# Patient Record
Sex: FEMALE | Race: BLACK OR AFRICAN AMERICAN | NOT HISPANIC OR LATINO | Employment: FULL TIME | URBAN - METROPOLITAN AREA
[De-identification: names, ages, dates, MRNs, and addresses within clinical notes are randomized per-mention and may not be internally consistent; named-entity substitution may affect disease eponyms.]

---

## 2024-03-08 ENCOUNTER — HOSPITAL ENCOUNTER (OUTPATIENT)
Dept: VASCULAR MEDICINE | Facility: CLINIC | Age: 54
Discharge: HOME | End: 2024-03-08
Payer: COMMERCIAL

## 2024-03-08 DIAGNOSIS — R09.89 OTHER SPECIFIED SYMPTOMS AND SIGNS INVOLVING THE CIRCULATORY AND RESPIRATORY SYSTEMS: ICD-10-CM

## 2024-03-08 DIAGNOSIS — I65.23 OCCLUSION AND STENOSIS OF BILATERAL CAROTID ARTERIES: ICD-10-CM

## 2024-03-08 DIAGNOSIS — I77.3 ARTERIAL FIBROMUSCULAR DYSPLASIA (CMS-HCC): ICD-10-CM

## 2024-03-08 DIAGNOSIS — I77.1 STRICTURE OF ARTERY (CMS-HCC): ICD-10-CM

## 2024-03-08 PROCEDURE — 93880 EXTRACRANIAL BILAT STUDY: CPT | Performed by: SURGERY

## 2024-03-08 PROCEDURE — 93880 EXTRACRANIAL BILAT STUDY: CPT

## 2024-04-15 ENCOUNTER — OFFICE VISIT (OUTPATIENT)
Dept: CARDIOLOGY | Facility: HOSPITAL | Age: 54
End: 2024-04-15
Payer: COMMERCIAL

## 2024-04-15 VITALS
SYSTOLIC BLOOD PRESSURE: 132 MMHG | HEIGHT: 65 IN | DIASTOLIC BLOOD PRESSURE: 82 MMHG | HEART RATE: 94 BPM | OXYGEN SATURATION: 97 % | WEIGHT: 279 LBS | BODY MASS INDEX: 46.48 KG/M2

## 2024-04-15 DIAGNOSIS — E87.6 HYPOKALEMIA: ICD-10-CM

## 2024-04-15 DIAGNOSIS — I10 ESSENTIAL HYPERTENSION, BENIGN: ICD-10-CM

## 2024-04-15 PROBLEM — I77.3 FIBROMUSCULAR DYSPLASIA (CMS-HCC): Status: ACTIVE | Noted: 2024-04-15

## 2024-04-15 PROCEDURE — 3079F DIAST BP 80-89 MM HG: CPT | Performed by: INTERNAL MEDICINE

## 2024-04-15 PROCEDURE — 3075F SYST BP GE 130 - 139MM HG: CPT | Performed by: INTERNAL MEDICINE

## 2024-04-15 PROCEDURE — 99214 OFFICE O/P EST MOD 30 MIN: CPT | Performed by: INTERNAL MEDICINE

## 2024-04-15 PROCEDURE — 1036F TOBACCO NON-USER: CPT | Performed by: INTERNAL MEDICINE

## 2024-04-15 RX ORDER — POTASSIUM CHLORIDE 750 MG/1
20 TABLET, FILM COATED, EXTENDED RELEASE ORAL DAILY
COMMUNITY

## 2024-04-15 RX ORDER — LOSARTAN POTASSIUM 50 MG/1
50 TABLET ORAL DAILY
Qty: 90 TABLET | Refills: 3 | Status: SHIPPED | OUTPATIENT
Start: 2024-04-15 | End: 2025-04-15

## 2024-04-15 RX ORDER — AMLODIPINE BESYLATE 5 MG/1
5 TABLET ORAL DAILY
COMMUNITY
Start: 2024-02-28

## 2024-04-15 RX ORDER — SEMAGLUTIDE 1.34 MG/ML
1 INJECTION, SOLUTION SUBCUTANEOUS
COMMUNITY
Start: 2024-01-24

## 2024-04-15 RX ORDER — LISINOPRIL 20 MG/1
20 TABLET ORAL
COMMUNITY
Start: 2024-03-27 | End: 2024-04-15 | Stop reason: SINTOL

## 2024-04-15 NOTE — PATIENT INSTRUCTIONS
Stop Lisinopril and start Losartan 50mg once daily.  Ordered blood work - we will reach out with results.     See you back in 6 months for clinical check in.    Luna Pagan MD  Co-Director, Vascular Center  [facility] [place], [facility]   [facility] Master Clinician in Fibromuscular Dysplasia and Vascular Care  Professor of Medicine  [place]

## 2024-04-15 NOTE — LETTER
April 15, 2024     Maegan Licona MD  [id number]  [facility] [address] [address]    Patient: Maxine Saavedra   YOB: 1970   Date of Visit: 4/15/2024       Dear Colleagues,    Ms. Saavedra came to see me for a follow-up on her carotid tortuosity, mild possible FMD. I think all is stable and honestly, she doesn't need any imaging for 1-2 years.    I did take the liberty of switching lisinopril 20 mg to losartan 50 mg; she is having throat itch worse with escalating the lisinopril dose -- does not seem like angioedema.  Her hypokalemia is strange ? Check mello/renin. She is going to follow-up with all of you on her Ozempic -- having a lot of issues with belching and diarrhea. All the best.    Sincerely,     Luna Pagan MD      CC: Zulay Del Cid, APRN-CNP  ELOISE Bond-CHARLI  ______________________________________________________________________________________    04/15/24  5:00 PM    Vascular Medicine    History of Present Illness  This terrific 53 year-old woman, a  and mom to a 26 year-old son, is seen in follow-up of possible mild bilateral ICA FMD.    She has a longstanding hx of intermittent chest pain and palpitations. She had a cardiac work-up with Dr. Fernandes in 2021.  In November, 2022 she had an episode of left arm heaviness/numbness at work that lasted seconds. A few weeks later, she had an episode of dizziness and chest discomfort along with palpitations (11.28.202). She was seen in the [place] ER. She had low serum potassium. She underwent CTA head/neck which reported ICA FMD. She actually had another ER visit in December which leg pain and numbness -- CTA head no abnormality.  She has no FMD below the neck.    Since I saw her last April, she had an ER visit in 10.2023 with intense headache -- this led to another CTA head/neck with the same finding of possible ICA FMD.  "She then had an evaluation at the [facility] FMD program with Dr. Mills who also thought her ICA findings were not classical for FMD. Below neck imaging done CTA c/a/p with right subclavian ectasia, no FMD.    She has been doing OK overall since October.  No recurrent headaches.  No major chest pain. She had lost some weight on Ozempic but recently developed episodes of severe \"sulphur burps\" and diarrhea and will likely be stopping this.    She has had higher lisinopril requirements for HTN 5---20---40 mg now back down to 20 mg with addition of amlodipine. She is worried lisinopril gives her an itchy throat that has worsened at higher doses.  She has recently had hypokalemia, now on KCL 20 meq QD.    Patient Active Problem List   Diagnosis    Fibromuscular dysplasia (CMS-HCC)     Past Medical History:   Diagnosis Date    Hypertension     Obesity     Other specified noninflammatory disorders of vagina 02/09/2015    Vaginal odor     Past Surgical History:   Procedure Laterality Date    GALLBLADDER SURGERY  12/15/2016    Gallbladder Surgery     ON examination:  .Patient Vitals for the past 24 hrs:   BP Pulse SpO2 Height Weight   04/15/24 1603 132/82 -- -- -- --   04/15/24 1602 138/84 94 97 % 1.651 m (5' 5\") 127 kg (279 lb)   HEENT benign, no Aj's  No cervical bruits  +S1, S2, RRR; no m/r/g  No abdominal bruits  Palpable pulses     Results/Data  Carotid duplex  3.2024  Tortuosity both ICAs with associated slight velocity shift, not as high as last year  Right ICA  cm/sec  Left ICA  cm/sed    10.2023 CTA head/neck  Minor bilateral ICA luminal irregularities reported  And ? Mild MCA stenoses bilaterally    10.2023 CTA c/a/p  Reported no FMD, aneurysms, dissections below neck    Prior imaging  CTA head/neck from 11.2022  She has Bilateral ICA tortuosity, redundancy  Left ICA has perhaps 2 \"beads' in an area of looping  No IC aneurysms  Vertebrals normal  Aortic arch OK     CTA abd/pelvis 12.2022  Abdominal " "aorta/iliacs normal  I don't see renal, mesenteric, or iliac FMD  Ascending and descending thoracic aorta normal     Renal duplex 12.2022 normal velocities, normal flow into the kidneys        Assessment/Plan:      53 year-old woman with migraine headaches, episodes of dizziness, neck pain, pulsatile tinnitus. She has been found to have ICA abnormalities  bilaterally, mainly ICA tortuosity, redundancy, S-curves. In the left ICA loop, she may have a few beads. Her imaging is not classical for FMD. It is likely some very mild form of FMD (and tortuosity is associated with this), but its extent would not qualify as a patient with multifocal FMD for the [place] Registry. She has no FMD below the neck, now seen again on 10.2023 CTA at [facility].    At this time, would obtain carotid duplex 1-2 years.    She has HTN now on lisinopril + amlodipine. She has \"scratchy throat\" on lisinopril, I think could see if sx better on losartan 50 mg QD; continue amlodipine and discontinue lisinopril.  Check repeat BMP, aldosterone/renin given hypokalemia on lisinopril.    Reasonable to continue aspirin.    She is working on weight loss but may need to go off Ozempic. She will follow-up with her other providers.    WE discussed how she has honestly been somewhat over imaged over the past 1 1/2 years for minor ICA abnormalities.    RTC 6 months for clinical check in on HTN. Carotid duplex could honestly wait 2 years.    Luna Pagan MD    "

## 2024-04-15 NOTE — PROGRESS NOTES
"04/15/24  5:00 PM    Vascular Medicine    History of Present Illness  This terrific 53 year-old woman, a  and mom to a 26 year-old son, is seen in follow-up of possible mild bilateral ICA FMD.    She has a longstanding hx of intermittent chest pain and palpitations. She had a cardiac work-up with Dr. Fernandes in 2021.  In November, 2022 she had an episode of left arm heaviness/numbness at work that lasted seconds. A few weeks later, she had an episode of dizziness and chest discomfort along with palpitations (11.28.202). She was seen in the [place] ER. She had low serum potassium. She underwent CTA head/neck which reported ICA FMD. She actually had another ER visit in December which leg pain and numbness -- CTA head no abnormality.  She has no FMD below the neck.    Since I saw her last April, she had an ER visit in 10.2023 with intense headache -- this led to another CTA head/neck with the same finding of possible ICA FMD. She then had an evaluation at the [facility] FMD program with Dr. Mills who also thought her ICA findings were not classical for FMD. Below neck imaging done CTA c/a/p with right subclavian ectasia, no FMD.    She has been doing OK overall since October.  No recurrent headaches.  No major chest pain. She had lost some weight on Ozempic but recently developed episodes of severe \"sulphur burps\" and diarrhea and will likely be stopping this.    She has had higher lisinopril requirements for HTN 5---20---40 mg now back down to 20 mg with addition of amlodipine. She is worried lisinopril gives her an itchy throat that has worsened at higher doses.  She has recently had hypokalemia, now on KCL 20 meq QD.    Patient Active Problem List   Diagnosis    Fibromuscular dysplasia (CMS-HCC)     Past Medical History:   Diagnosis Date    Hypertension     Obesity     Other specified noninflammatory disorders of vagina 02/09/2015    Vaginal odor     Past Surgical History:   Procedure " "Laterality Date    GALLBLADDER SURGERY  12/15/2016    Gallbladder Surgery     ON examination:  .Patient Vitals for the past 24 hrs:   BP Pulse SpO2 Height Weight   04/15/24 1603 132/82 -- -- -- --   04/15/24 1602 138/84 94 97 % 1.651 m (5' 5\") 127 kg (279 lb)   HEENT benign, no Aj's  No cervical bruits  +S1, S2, RRR; no m/r/g  No abdominal bruits  Palpable pulses     Results/Data  Carotid duplex  3.2024  Tortuosity both ICAs with associated slight velocity shift, not as high as last year  Right ICA  cm/sec  Left ICA  cm/sed    10.2023 CTA head/neck  Minor bilateral ICA luminal irregularities reported  And ? Mild MCA stenoses bilaterally    10.2023 CTA c/a/p  Reported no FMD, aneurysms, dissections below neck    Prior imaging  CTA head/neck from 11.2022  She has Bilateral ICA tortuosity, redundancy  Left ICA has perhaps 2 \"beads' in an area of looping  No IC aneurysms  Vertebrals normal  Aortic arch OK     CTA abd/pelvis 12.2022  Abdominal aorta/iliacs normal  I don't see renal, mesenteric, or iliac FMD  Ascending and descending thoracic aorta normal     Renal duplex 12.2022 normal velocities, normal flow into the kidneys        Assessment/Plan:      53 year-old woman with migraine headaches, episodes of dizziness, neck pain, pulsatile tinnitus. She has been found to have ICA abnormalities  bilaterally, mainly ICA tortuosity, redundancy, S-curves. In the left ICA loop, she may have a few beads. Her imaging is not classical for FMD. It is likely some very mild form of FMD (and tortuosity is associated with this), but its extent would not qualify as a patient with multifocal FMD for the [place] Registry. She has no FMD below the neck, now seen again on 10.2023 CTA at [facility].    At this time, would obtain carotid duplex 1-2 years.    She has HTN now on lisinopril + amlodipine. She has \"scratchy throat\" on lisinopril, I think could see if sx better on losartan 50 mg QD; continue amlodipine and discontinue " lisinopril.  Check repeat BMP, aldosterone/renin given hypokalemia on lisinopril.    Reasonable to continue aspirin.    She is working on weight loss but may need to go off Ozempic. She will follow-up with her other providers.    WE discussed how she has honestly been somewhat over imaged over the past 1 1/2 years for minor ICA abnormalities.    RTC 6 months for clinical check in on HTN. Carotid duplex could honestly wait 2 years.    Luna Pagan MD

## 2024-05-15 ENCOUNTER — LAB (OUTPATIENT)
Dept: LAB | Facility: LAB | Age: 54
End: 2024-05-15
Payer: COMMERCIAL

## 2024-05-15 ENCOUNTER — TELEPHONE (OUTPATIENT)
Dept: CARDIOLOGY | Facility: HOSPITAL | Age: 54
End: 2024-05-15
Payer: COMMERCIAL

## 2024-05-15 DIAGNOSIS — R53.83 OTHER FATIGUE: ICD-10-CM

## 2024-05-15 DIAGNOSIS — E87.6 HYPOKALEMIA: ICD-10-CM

## 2024-05-15 LAB
ANION GAP SERPL CALC-SCNC: 14 MMOL/L (ref 10–20)
BUN SERPL-MCNC: 9 MG/DL (ref 6–23)
CALCIUM SERPL-MCNC: 9.1 MG/DL (ref 8.6–10.6)
CHLORIDE SERPL-SCNC: 106 MMOL/L (ref 98–107)
CO2 SERPL-SCNC: 24 MMOL/L (ref 21–32)
CREAT SERPL-MCNC: 0.77 MG/DL (ref 0.5–1.05)
EGFRCR SERPLBLD CKD-EPI 2021: >90 ML/MIN/1.73M*2
ERYTHROCYTE [DISTWIDTH] IN BLOOD BY AUTOMATED COUNT: 13.2 % (ref 11.5–14.5)
GLUCOSE SERPL-MCNC: 123 MG/DL (ref 74–99)
HCT VFR BLD AUTO: 38.3 % (ref 36–46)
HGB BLD-MCNC: 11.6 G/DL (ref 12–16)
MCH RBC QN AUTO: 29 PG (ref 26–34)
MCHC RBC AUTO-ENTMCNC: 30.3 G/DL (ref 32–36)
MCV RBC AUTO: 96 FL (ref 80–100)
NRBC BLD-RTO: 0 /100 WBCS (ref 0–0)
PLATELET # BLD AUTO: 381 X10*3/UL (ref 150–450)
POTASSIUM SERPL-SCNC: 3.7 MMOL/L (ref 3.5–5.3)
RBC # BLD AUTO: 4 X10*6/UL (ref 4–5.2)
SODIUM SERPL-SCNC: 140 MMOL/L (ref 136–145)
TSH SERPL-ACNC: 1.25 MIU/L (ref 0.44–3.98)
WBC # BLD AUTO: 6.2 X10*3/UL (ref 4.4–11.3)

## 2024-05-15 PROCEDURE — 82088 ASSAY OF ALDOSTERONE: CPT

## 2024-05-15 PROCEDURE — 84443 ASSAY THYROID STIM HORMONE: CPT

## 2024-05-15 PROCEDURE — 85027 COMPLETE CBC AUTOMATED: CPT

## 2024-05-15 PROCEDURE — 36415 COLL VENOUS BLD VENIPUNCTURE: CPT

## 2024-05-15 PROCEDURE — 80048 BASIC METABOLIC PNL TOTAL CA: CPT

## 2024-05-16 ENCOUNTER — PATIENT MESSAGE (OUTPATIENT)
Dept: CARDIOLOGY | Facility: HOSPITAL | Age: 54
End: 2024-05-16

## 2024-05-18 LAB
ALDOST SERPL-MCNC: 6.8 NG/DL
ALDOST/RENIN PLAS-RTO: 0.6 RATIO
RENIN PLAS-CCNC: 12 NG/ML/HR

## 2024-07-26 ENCOUNTER — PATIENT MESSAGE (OUTPATIENT)
Dept: CARDIOLOGY | Facility: HOSPITAL | Age: 54
End: 2024-07-26

## 2024-07-26 DIAGNOSIS — I10 ESSENTIAL HYPERTENSION, BENIGN: ICD-10-CM

## 2024-07-29 RX ORDER — HYDROCHLOROTHIAZIDE 12.5 MG/1
12.5 TABLET ORAL DAILY
Qty: 90 TABLET | Refills: 3 | Status: SHIPPED | OUTPATIENT
Start: 2024-07-29 | End: 2025-07-29

## 2024-10-14 ENCOUNTER — TELEMEDICINE (OUTPATIENT)
Dept: CARDIOLOGY | Facility: HOSPITAL | Age: 54
End: 2024-10-14
Payer: COMMERCIAL

## 2024-10-14 DIAGNOSIS — I77.3 FIBROMUSCULAR DYSPLASIA (CMS-HCC): Primary | ICD-10-CM

## 2024-10-14 DIAGNOSIS — I10 ESSENTIAL HYPERTENSION, BENIGN: ICD-10-CM

## 2024-10-14 PROCEDURE — 1036F TOBACCO NON-USER: CPT | Performed by: INTERNAL MEDICINE

## 2024-10-14 PROCEDURE — 99214 OFFICE O/P EST MOD 30 MIN: CPT | Performed by: INTERNAL MEDICINE

## 2024-10-14 RX ORDER — LOSARTAN POTASSIUM 100 MG/1
100 TABLET ORAL DAILY
Qty: 90 TABLET | Refills: 3 | Status: SHIPPED | OUTPATIENT
Start: 2024-10-14 | End: 2025-10-14

## 2024-10-14 NOTE — PATIENT INSTRUCTIONS
Increase Losartan to 100mg once daily.  Continue other medications as prescribed.    See you back in 4 months for check in.    Luna Pagan MD  Co-Director, Vascular Center  [facility] [place], [facility]   [facility] Naz[facility] Family Master Clinician in Fibromuscular Dysplasia and Vascular Care  Professor of Medicine  [facility]

## 2024-10-14 NOTE — LETTER
October 14, 2024     Maegan Licona MD  [address]  The [address] [address]    Patient: Maxine Saavedra   YOB: 1970   Date of Visit: 10/14/2024       Dear Dr. Maegan Licona MD:    Thank you for referring Maxine Saavedra to me for evaluation. Below are my notes for this consultation.  If you have questions, please do not hesitate to call me. I look forward to following your patient along with you.       Sincerely,     Luna Pagan MD      CC: No Recipients  ______________________________________________________________________________________    10/14/24  10:05 AM    Vascular Medicine    Virtual or Telephone Consent    An interactive audio and video telecommunication system which permits real time communications between the patient (at the originating site) and provider (at the distant site) was utilized to provide this telehealth service.   Verbal consent was requested and obtained from Maxine Saavedra on this date, 10/14/24 for a telehealth visit.       History of Present Illness  This terrific 53 year-old woman, a  and mom to an adult son is seen in follow-up of possible mild bilateral ICA FMD, HTN.    She has a longstanding hx of intermittent chest pain and palpitations. She had a cardiac work-up with Dr. Fernandes in 2021.  In November, 2022 she had an episode of left arm heaviness/numbness at work that lasted seconds. A few weeks later, she had an episode of dizziness and chest discomfort along with palpitations (11.28.202). She was seen in the [place] ER. She had low serum potassium. She underwent CTA head/neck which reported ICA FMD. She actually had another ER visit in December which leg pain and numbness -- CTA head no abnormality.  She has no FMD below the neck  She has had repeat CTA head/neck with similar findings.    Since I saw her last April, she tried Ozempic  "but had terrible GI side effects. She lost ~15# but unfortunately regained this. She is doing fine on losartan 50 mg at bedtime and amlodipine and low dose hydrochlorothiazide. Bps are generally better, but still 120s-mid to high 140s/82.    She is working with endocrinology on other options for weight loss --- tried Wellbutrin with mental health effects.     Patient Active Problem List   Diagnosis   • Fibromuscular dysplasia (CMS-HCC)   • Essential hypertension, benign   • Hypokalemia     Past Medical History:   Diagnosis Date   • Hypertension    • Obesity    • Other specified noninflammatory disorders of vagina 02/09/2015    Vaginal odor     Past Surgical History:   Procedure Laterality Date   • GALLBLADDER SURGERY  12/15/2016    Gallbladder Surgery     ON examination:  HEENT benign, no Aj's  Breathing non labored  Fluid speech     Results/Data  8.2204 Labs (CC) Reviewed  Cr 0.79  K 3.4  Na 141  HgB A1c 4.9%    Tchol 130/HDL 54/LDL 60/Trig 79  TSH 1.25    HgB 11.6, PLT 381K    Carotid duplex  3.2024  Tortuosity both ICAs with associated slight velocity shift, not as high as last year  Right ICA  cm/sec  Left ICA  cm/sed    10.2023 CTA head/neck  Minor bilateral ICA luminal irregularities reported  And ? Mild MCA stenoses bilaterally    10.2023 CTA c/a/p  Reported no FMD, aneurysms, dissections below neck    Prior imaging  CTA head/neck from 11.2022  She has Bilateral ICA tortuosity, redundancy  Left ICA has perhaps 2 \"beads' in an area of looping  No IC aneurysms  Vertebrals normal  Aortic arch OK     CTA abd/pelvis 12.2022  Abdominal aorta/iliacs normal  I didn't see renal, mesenteric, or iliac FMD  Ascending and descending thoracic aorta normal     Renal duplex 12.2022 normal velocities, normal flow into the kidneys        Assessment/Plan:      53 year-old woman with migraine headaches, episodes of dizziness, neck pain, pulsatile tinnitus. She has been found to have ICA abnormalities  " bilaterally, mainly ICA tortuosity, redundancy, S-curves. In the left ICA loop, she may have a few beads. Her imaging is not classical for FMD. It is likely some very mild form of FMD (and tortuosity is associated with this), but its extent would not qualify as a patient with multifocal FMD for the [place] Registry. She has no FMD below the neck, now seen again on 10.2023 CTA at [facility].  Reasonable to continue aspirin for possible mild FMD.    In terms of FMD, would obtain carotid duplex at 2 year marked.  Repeat head to pelvis imaging perhaps 2028. She has had more than adequate vascular imaging at this time, and I think we need to space things out.    BP continues to be a challenge.  BP better on losartan + amlodipine + hydrochlorothiazide but not normalized. We will try to escalate losartan to 100 mg and see how she does with this. Her last potassium was on low side, so I'm reluctant to escalate hydrochlorothiazide.    She is working on weight loss; did not tolerate GLP1.  She is followed by endocrinology at [facility] and we discussed some life stype modifications beyond pharmacotherapy.    RTC for BP check 4 months and repeat labs.  Don't need to repeat carotid duplex until 2026.    Luna Pagan MD

## 2024-10-14 NOTE — PROGRESS NOTES
10/14/24  10:05 AM    Vascular Medicine    Virtual or Telephone Consent    An interactive audio and video telecommunication system which permits real time communications between the patient (at the originating site) and provider (at the distant site) was utilized to provide this telehealth service.   Verbal consent was requested and obtained from Maxine Astorgakumar on this date, 10/14/24 for a telehealth visit.       History of Present Illness  This terrific 53 year-old woman, a  and mom to an adult son is seen in follow-up of possible mild bilateral ICA FMD, HTN.    She has a longstanding hx of intermittent chest pain and palpitations. She had a cardiac work-up with Dr. Fernandes in 2021.  In November, 2022 she had an episode of left arm heaviness/numbness at work that lasted seconds. A few weeks later, she had an episode of dizziness and chest discomfort along with palpitations (11.28.202). She was seen in the [place] ER. She had low serum potassium. She underwent CTA head/neck which reported ICA FMD. She actually had another ER visit in December which leg pain and numbness -- CTA head no abnormality.  She has no FMD below the neck  She has had repeat CTA head/neck with similar findings.    Since I saw her last April, she tried Ozempic but had terrible GI side effects. She lost ~15# but unfortunately regained this. She is doing fine on losartan 50 mg at bedtime and amlodipine and low dose hydrochlorothiazide. Bps are generally better, but still 120s-mid to high 140s/82.    She is working with endocrinology on other options for weight loss --- tried Wellbutrin with mental health effects.     Patient Active Problem List   Diagnosis    Fibromuscular dysplasia (CMS-HCC)    Essential hypertension, benign    Hypokalemia     Past Medical History:   Diagnosis Date    Hypertension     Obesity     Other specified noninflammatory disorders of vagina 02/09/2015    Vaginal odor     Past Surgical  "History:   Procedure Laterality Date    GALLBLADDER SURGERY  12/15/2016    Gallbladder Surgery     ON examination:  HEENT benign, no Aj's  Breathing non labored  Fluid speech     Results/Data  8.2204 Labs ([facility]) Reviewed  Cr 0.79  K 3.4  Na 141  HgB A1c 4.9%    Tchol 130/HDL 54/LDL 60/Trig 79  TSH 1.25    HgB 11.6, PLT 381K    Carotid duplex  3.2024  Tortuosity both ICAs with associated slight velocity shift, not as high as last year  Right ICA  cm/sec  Left ICA  cm/sed    10.2023 CTA head/neck  Minor bilateral ICA luminal irregularities reported  And ? Mild MCA stenoses bilaterally    10.2023 CTA c/a/p  Reported no FMD, aneurysms, dissections below neck    Prior imaging  CTA head/neck from 11.2022  She has Bilateral ICA tortuosity, redundancy  Left ICA has perhaps 2 \"beads' in an area of looping  No IC aneurysms  Vertebrals normal  Aortic arch OK     CTA abd/pelvis 12.2022  Abdominal aorta/iliacs normal  I didn't see renal, mesenteric, or iliac FMD  Ascending and descending thoracic aorta normal     Renal duplex 12.2022 normal velocities, normal flow into the kidneys        Assessment/Plan:      53 year-old woman with migraine headaches, episodes of dizziness, neck pain, pulsatile tinnitus. She has been found to have ICA abnormalities  bilaterally, mainly ICA tortuosity, redundancy, S-curves. In the left ICA loop, she may have a few beads. Her imaging is not classical for FMD. It is likely some very mild form of FMD (and tortuosity is associated with this), but its extent would not qualify as a patient with multifocal FMD for the [place] Registry. She has no FMD below the neck, now seen again on 10.2023 CTA at [facility].  Reasonable to continue aspirin for possible mild FMD.    In terms of FMD, would obtain carotid duplex at 2 year marked.  Repeat head to pelvis imaging perhaps 2028. She has had more than adequate vascular imaging at this time, and I think we need to space things out.    BP continues to be " a challenge.  BP better on losartan + amlodipine + hydrochlorothiazide but not normalized. We will try to escalate losartan to 100 mg and see how she does with this. Her last potassium was on low side, so I'm reluctant to escalate hydrochlorothiazide.    She is working on weight loss; did not tolerate GLP1.  She is followed by endocrinology at [facility] and we discussed some life stype modifications beyond pharmacotherapy.    RTC for BP check 4 months and repeat labs.  Don't need to repeat carotid duplex until 2026.    Luna Pagan MD

## 2025-03-03 ENCOUNTER — OFFICE VISIT (OUTPATIENT)
Dept: CARDIOLOGY | Facility: HOSPITAL | Age: 55
End: 2025-03-03
Payer: COMMERCIAL

## 2025-03-03 VITALS
DIASTOLIC BLOOD PRESSURE: 79 MMHG | SYSTOLIC BLOOD PRESSURE: 120 MMHG | HEIGHT: 65 IN | OXYGEN SATURATION: 98 % | BODY MASS INDEX: 48.82 KG/M2 | HEART RATE: 88 BPM | WEIGHT: 293 LBS

## 2025-03-03 DIAGNOSIS — M79.89 LEFT LEG SWELLING: ICD-10-CM

## 2025-03-03 DIAGNOSIS — E87.6 HYPOKALEMIA: ICD-10-CM

## 2025-03-03 DIAGNOSIS — I10 ESSENTIAL HYPERTENSION, BENIGN: ICD-10-CM

## 2025-03-03 DIAGNOSIS — I77.3 FIBROMUSCULAR DYSPLASIA (CMS-HCC): Primary | ICD-10-CM

## 2025-03-03 PROCEDURE — 99214 OFFICE O/P EST MOD 30 MIN: CPT | Performed by: INTERNAL MEDICINE

## 2025-03-03 PROCEDURE — 3008F BODY MASS INDEX DOCD: CPT | Performed by: INTERNAL MEDICINE

## 2025-03-03 PROCEDURE — 3074F SYST BP LT 130 MM HG: CPT | Performed by: INTERNAL MEDICINE

## 2025-03-03 PROCEDURE — 3078F DIAST BP <80 MM HG: CPT | Performed by: INTERNAL MEDICINE

## 2025-03-03 PROCEDURE — 1036F TOBACCO NON-USER: CPT | Performed by: INTERNAL MEDICINE

## 2025-03-03 RX ORDER — SPIRONOLACTONE 25 MG/1
25 TABLET ORAL DAILY
Qty: 30 TABLET | Refills: 11 | Status: SHIPPED | OUTPATIENT
Start: 2025-03-03 | End: 2026-03-03

## 2025-03-03 NOTE — LETTER
March 3, 2025     Maegan Licona MD  [address]  [facility] [address] [address]    Patient: Maxine Saavedra   YOB: 1970   Date of Visit: 3/3/2025       Dear Dr. Maegan Licona MD:    Thank you for referring Maxine Saavedra to me for evaluation. Below are my notes for this consultation.  If you have questions, please do not hesitate to call me. I look forward to following your patient along with you.       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    She has a longstanding hx of intermittent chest pain and palpitations. She had a cardiac work-up with Dr. Fernandes in 2021.  In November, 2022 she had an episode of left arm heaviness/numbness at work that lasted seconds. A few weeks later, she had an episode of dizziness and chest discomfort along with palpitations (11.28.202). She was seen in the [place] ER. She had low serum potassium. She underwent CTA head/neck which reported ICA FMD. She actually had another ER visit in December which leg pain and numbness -- CTA head no abnormality.  She has no FMD below the neck  She has had repeat CTA head/neck with similar findings.    We have been managing her HTN and escalating meds. At our October visit, we escalated losartan to 100 mg/day top amlodpine and hydrochlorothiazide.    Since I saw her last, Bps generally now well controlled 110s/70s, but potassium still low and she is having left moreso right leg swelling. She is due to undergo hysteroscopy.    She may try a different GLP1 agonist.      Patient Active Problem List  "  Diagnosis   • Fibromuscular dysplasia (CMS-HCC)   • Essential hypertension, benign   • Hypokalemia     Past Medical History:   Diagnosis Date   • Hypertension    • Obesity    • Other specified noninflammatory disorders of vagina 02/09/2015    Vaginal odor     Past Surgical History:   Procedure Laterality Date   • GALLBLADDER SURGERY  12/15/2016    Gallbladder Surgery     ON examination:  Patient Vitals for the past 24 hrs:   BP Pulse SpO2 Height Weight   03/03/25 1602 120/79 -- -- -- --   03/03/25 1601 142/77 88 98 % 1.651 m (5' 5\") 133 kg (294 lb)   HEENT benign, no Aj's  No cervical bruits  +S1, S2, RRR; no m/r/g  Clear lungs  Trace - 1+ left moreso right leg edema  Breathing non labored  Fluid speech    Results/Data reviewed  2.2025  HgB 11.9, PLT 351K  Cr 0.68  K 3.6  Na 140    8.2204 Labs ([facility]) Reviewed  Tchol 130/HDL 54/LDL 60/Trig 79    Carotid duplex  3.2024  Tortuosity both ICAs with associated slight velocity shift, not as high as last year  Right ICA  cm/sec  Left ICA  cm/sed    10.2023 CTA head/neck  Minor bilateral ICA luminal irregularities reported  And ? Mild MCA stenoses bilaterally    10.2023 CTA c/a/p  Reported no FMD, aneurysms, dissections below neck    Prior imaging  CTA head/neck from 11.2022  She has Bilateral ICA tortuosity, redundancy  Left ICA has perhaps 2 \"beads' in an area of looping  No IC aneurysms  Vertebrals normal  Aortic arch OK     CTA abd/pelvis 12.2022  Abdominal aorta/iliacs normal  I didn't see renal, mesenteric, or iliac FMD  Ascending and descending thoracic aorta normal     Renal duplex 12.2022 normal velocities, normal flow into the kidneys        Assessment/Plan:      55 year-old woman with migraine headaches, episodes of dizziness, neck pain, pulsatile tinnitus. She has been found to have ICA abnormalities  bilaterally, mainly ICA tortuosity, redundancy, S-curves. In the left ICA loop, she may have a few beads. Her imaging is not classical for FMD. " It is likely some very mild form of FMD (and tortuosity is associated with this), but its extent would not qualify as a patient with multifocal FMD for the [place] Registry. She has no FMD below the neck, now seen again on 10.2023 CTA at [facility].  Reasonable to continue aspirin for possible mild FMD.    In terms of FMD, would obtain carotid duplex at 2 year eloisa.  Repeat head to pelvis imaging perhaps 2028. She has had more than adequate vascular imaging at this time, and I think we need to space things out.    BP continues to be a challenge.  BP better on losartan now 100 mg/day + amlodipine 5 mg/day + hydrochlorothiazide 12.5 mg QD  but she has leg swelling and hypokalemia despite KCL 20 meq/day.    At this time, I will add spironlactone 25 mg/day. Prior mello/renin ratio normal, but let's see if this helps. If BP better controlled could drop amlodipine dose which would help leg swelling.  She will stop KCL.    She has leg swelling, likely amlodipine related but will check d-dimer; if d-dimer high, needs venous duplex.    OK to hold aspirin for GYN surgery for a few days if needed.    RTC for BP check 4 months and repeat labs.  Don't need to repeat carotid duplex until 2026.    Luna Pagan MD

## 2025-03-03 NOTE — PATIENT INSTRUCTIONS
Start Spironolactone 25mg once daily.  Stop Potassium.  Continue other prescribed meds.    Ordered blood work today - we will reach out with results.    See you back in 3-4 months.     Luna Pagan MD  Co-Director, Vascular Center  [facility] [place], [facility]   [facility] Family Master Clinician in Fibromuscular Dysplasia and Vascular Care  Professor of Medicine  [facility]

## 2025-03-03 NOTE — PROGRESS NOTES
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    She has a longstanding hx of intermittent chest pain and palpitations. She had a cardiac work-up with Dr. Fernandes in 2021.  In November, 2022 she had an episode of left arm heaviness/numbness at work that lasted seconds. A few weeks later, she had an episode of dizziness and chest discomfort along with palpitations (11.28.202). She was seen in the [place] ER. She had low serum potassium. She underwent CTA head/neck which reported ICA FMD. She actually had another ER visit in December which leg pain and numbness -- CTA head no abnormality.  She has no FMD below the neck  She has had repeat CTA head/neck with similar findings.    We have been managing her HTN and escalating meds. At our October visit, we escalated losartan to 100 mg/day top amlodpine and hydrochlorothiazide.    Since I saw her last, Bps generally now well controlled 110s/70s, but potassium still low and she is having left moreso right leg swelling. She is due to undergo hysteroscopy.    She may try a different GLP1 agonist.      Patient Active Problem List   Diagnosis    Fibromuscular dysplasia (CMS-HCC)    Essential hypertension, benign    Hypokalemia     Past Medical History:   Diagnosis Date    Hypertension     Obesity     Other specified noninflammatory disorders of vagina 02/09/2015    Vaginal odor     Past Surgical History:   Procedure Laterality Date    GALLBLADDER SURGERY  12/15/2016    Gallbladder Surgery     ON examination:  Patient Vitals for the past 24 hrs:   BP Pulse SpO2 Height Weight   03/03/25 1602 120/79 -- -- -- --   03/03/25 1601 142/77 88 98 % 1.651 m (5' 5\") 133 kg (294 lb)   HEENT benign, no Aj's  No cervical bruits  +S1, S2, RRR; no m/r/g  Clear lungs  Trace - 1+ left moreso right " "leg edema  Breathing non labored  Fluid speech    Results/Data reviewed  2.2025  HgB 11.9, PLT 351K  Cr 0.68  K 3.6  Na 140    8.2204 Labs ([facility]) Reviewed  Tchol 130/HDL 54/LDL 60/Trig 79    Carotid duplex  3.2024  Tortuosity both ICAs with associated slight velocity shift, not as high as last year  Right ICA  cm/sec  Left ICA  cm/sed    10.2023 CTA head/neck  Minor bilateral ICA luminal irregularities reported  And ? Mild MCA stenoses bilaterally    10.2023 CTA c/a/p  Reported no FMD, aneurysms, dissections below neck    Prior imaging  CTA head/neck from 11.2022  She has Bilateral ICA tortuosity, redundancy  Left ICA has perhaps 2 \"beads' in an area of looping  No IC aneurysms  Vertebrals normal  Aortic arch OK     CTA abd/pelvis 12.2022  Abdominal aorta/iliacs normal  I didn't see renal, mesenteric, or iliac FMD  Ascending and descending thoracic aorta normal     Renal duplex 12.2022 normal velocities, normal flow into the kidneys        Assessment/Plan:      55 year-old woman with migraine headaches, episodes of dizziness, neck pain, pulsatile tinnitus. She has been found to have ICA abnormalities  bilaterally, mainly ICA tortuosity, redundancy, S-curves. In the left ICA loop, she may have a few beads. Her imaging is not classical for FMD. It is likely some very mild form of FMD (and tortuosity is associated with this), but its extent would not qualify as a patient with multifocal FMD for the [place] Registry. She has no FMD below the neck, now seen again on 10.2023 CTA at [facility].  Reasonable to continue aspirin for possible mild FMD.    In terms of FMD, would obtain carotid duplex at 2 year eloisa.  Repeat head to pelvis imaging perhaps 2028. She has had more than adequate vascular imaging at this time, and I think we need to space things out.    BP continues to be a challenge.  BP better on losartan now 100 mg/day + amlodipine 5 mg/day + hydrochlorothiazide 12.5 mg QD  but she has leg swelling and " hypokalemia despite KCL 20 meq/day.    At this time, I will add spironlactone 25 mg/day. Prior mello/renin ratio normal, but let's see if this helps. If BP better controlled could drop amlodipine dose which would help leg swelling.  She will stop KCL.    She has leg swelling, likely amlodipine related but will check d-dimer; if d-dimer high, needs venous duplex.    OK to hold aspirin for GYN surgery for a few days if needed.    RTC for BP check 4 months and repeat labs.  Don't need to repeat carotid duplex until 2026.    Luna Pagan MD

## 2025-03-04 DIAGNOSIS — R79.89 ELEVATED D-DIMER: ICD-10-CM

## 2025-03-04 LAB
ANION GAP SERPL CALCULATED.4IONS-SCNC: 12 MMOL/L (CALC) (ref 7–17)
BUN SERPL-MCNC: 12 MG/DL (ref 7–25)
BUN/CREAT SERPL: NORMAL (CALC) (ref 6–22)
CALCIUM SERPL-MCNC: 9.4 MG/DL (ref 8.6–10.4)
CHLORIDE SERPL-SCNC: 106 MMOL/L (ref 98–110)
CO2 SERPL-SCNC: 23 MMOL/L (ref 20–32)
CREAT SERPL-MCNC: 0.81 MG/DL (ref 0.5–1.03)
D DIMER PPP FEU-MCNC: 0.63 MCG/ML FEU
EGFRCR SERPLBLD CKD-EPI 2021: 85 ML/MIN/1.73M2
GLUCOSE SERPL-MCNC: 97 MG/DL (ref 65–139)
POTASSIUM SERPL-SCNC: 3.8 MMOL/L (ref 3.5–5.3)
SODIUM SERPL-SCNC: 141 MMOL/L (ref 135–146)

## 2025-03-05 ENCOUNTER — HOSPITAL ENCOUNTER (OUTPATIENT)
Dept: VASCULAR MEDICINE | Facility: HOSPITAL | Age: 55
Discharge: HOME | End: 2025-03-05
Payer: COMMERCIAL

## 2025-03-05 DIAGNOSIS — R79.89 ELEVATED D-DIMER: ICD-10-CM

## 2025-03-05 DIAGNOSIS — M79.605 PAIN IN LEFT LEG: ICD-10-CM

## 2025-03-05 DIAGNOSIS — M79.604 PAIN IN RIGHT LEG: ICD-10-CM

## 2025-03-05 PROCEDURE — 93970 EXTREMITY STUDY: CPT | Performed by: SURGERY

## 2025-03-05 PROCEDURE — 93970 EXTREMITY STUDY: CPT

## 2025-05-24 ENCOUNTER — TELEPHONE (OUTPATIENT)
Dept: CARDIOLOGY | Facility: HOSPITAL | Age: 55
End: 2025-05-24
Payer: COMMERCIAL

## 2025-05-24 DIAGNOSIS — I10 ESSENTIAL HYPERTENSION, BENIGN: ICD-10-CM

## 2025-05-27 RX ORDER — HYDROCHLOROTHIAZIDE 12.5 MG/1
12.5 TABLET ORAL DAILY
Qty: 90 TABLET | Refills: 3 | Status: SHIPPED | OUTPATIENT
Start: 2025-05-27

## 2025-07-21 ENCOUNTER — TELEPHONE (OUTPATIENT)
Dept: CARDIOLOGY | Facility: HOSPITAL | Age: 55
End: 2025-07-21
Payer: COMMERCIAL

## 2025-07-23 DIAGNOSIS — I10 ESSENTIAL HYPERTENSION, BENIGN: ICD-10-CM

## 2025-07-29 ENCOUNTER — OFFICE VISIT (OUTPATIENT)
Dept: CARDIOLOGY | Facility: HOSPITAL | Age: 55
End: 2025-07-29
Payer: COMMERCIAL

## 2025-07-29 VITALS
SYSTOLIC BLOOD PRESSURE: 117 MMHG | OXYGEN SATURATION: 97 % | HEIGHT: 65 IN | HEART RATE: 113 BPM | WEIGHT: 293 LBS | DIASTOLIC BLOOD PRESSURE: 76 MMHG | BODY MASS INDEX: 48.82 KG/M2

## 2025-07-29 DIAGNOSIS — I10 ESSENTIAL HYPERTENSION, BENIGN: ICD-10-CM

## 2025-07-29 DIAGNOSIS — E87.6 HYPOKALEMIA: Primary | ICD-10-CM

## 2025-07-29 DIAGNOSIS — G47.33 OBSTRUCTIVE SLEEP APNEA (ADULT) (PEDIATRIC): ICD-10-CM

## 2025-07-29 PROCEDURE — 3074F SYST BP LT 130 MM HG: CPT | Performed by: INTERNAL MEDICINE

## 2025-07-29 PROCEDURE — 99212 OFFICE O/P EST SF 10 MIN: CPT | Performed by: INTERNAL MEDICINE

## 2025-07-29 PROCEDURE — 3078F DIAST BP <80 MM HG: CPT | Performed by: INTERNAL MEDICINE

## 2025-07-29 PROCEDURE — 1036F TOBACCO NON-USER: CPT | Performed by: INTERNAL MEDICINE

## 2025-07-29 PROCEDURE — 3008F BODY MASS INDEX DOCD: CPT | Performed by: INTERNAL MEDICINE

## 2025-07-29 PROCEDURE — 99214 OFFICE O/P EST MOD 30 MIN: CPT | Performed by: INTERNAL MEDICINE

## 2025-07-29 NOTE — PATIENT INSTRUCTIONS
Nice to see you today Ms. Saavedra.  Continue current meds.    Ordered blood work - we will reach out with results.    Luna Pagan MD  Co-Director, Vascular Center  [facility] [place], [facility]   [facility]yler Nazario Family Master Clinician in Fibromuscular Dysplasia and Vascular Care  Professor of Medicine  [facility]

## 2025-07-29 NOTE — PROGRESS NOTES
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    She has a longstanding hx of intermittent chest pain and palpitations. She had a cardiac work-up with Dr. Fernandes in 2021.  In November, 2022 she had an episode of left arm heaviness/numbness at work that lasted seconds. A few weeks later, she had an episode of dizziness and chest discomfort along with palpitations (11.28.202). She was seen in the [place] ER. She had low serum potassium. She underwent CTA head/neck which reported ICA FMD. She actually had another ER visit in 12.2023 for leg pain and numbness -- CTA head no abnormality.  She has no FMD below the neck  including no renal artery stenosis.  She has had repeat CTA head/neck with similar findings.    We have been managing her HTN and escalating meds. When I saw her in March we added spironolactone due to persistent hypokalemia and leg swelling on amlodipine (was going to stop the later).    Since I saw her last in March, Bps generally now well controlled 110s-120s/70s-80, but potassium still low. Spironolactone caused breast discomfort and she stopped this.     She has been dx with sleep apnea; being evaluated for trying another GLP1 agonist (Zepbound). She did not tolerate Ozempic at high doses in the past.    Patient Active Problem List   Diagnosis    Fibromuscular dysplasia    Essential hypertension, benign    Hypokalemia    Left leg swelling     Past Medical History:   Diagnosis Date    Hypertension     Obesity     Other specified noninflammatory disorders of vagina 02/09/2015    Vaginal odor    Sleep apnea      Past Surgical History:   Procedure Laterality Date    GALLBLADDER SURGERY  12/15/2016    Gallbladder Surgery     ON examination:  Patient Vitals for the past 24 hrs:   BP Pulse SpO2 Height Weight  "  07/29/25 1645 117/76 -- -- -- --   07/29/25 1620 104/67 -- -- -- --   07/29/25 1619 143/78 (!) 113 97 % 1.651 m (5' 5\") 136 kg (299 lb)   HEENT benign, no Aj's  No cervical bruits  +S1, S2, RRR; no m/r/g  Clear lungs  Trace edema  Breathing non labored  Fluid speech    Results/Data reviewed  7.2025  K 3.5 despite KCL supplements  Na 139  Cr 0.90  HgB 11.9, PLT 342K    Prior imaging   Carotid duplex [facility] 3.2024  Tortuosity both ICAs with associated slight velocity shift, not as high as last year  Right ICA  cm/sec  Left ICA  cm/sed    10.2023 CTA head/neck  Minor bilateral ICA luminal irregularities reported  And ? Mild MCA stenoses bilaterally    10.2023 CTA c/a/p  Reported no FMD, aneurysms, dissections below neck  No renal FMD    CTA head/neck from 11.2022  She has Bilateral ICA tortuosity, redundancy  Left ICA has perhaps 2 \"beads' in an area of looping  No IC aneurysms  Vertebrals normal  Aortic arch OK     CTA abd/pelvis 12.2022  Abdominal aorta/iliacs normal  No renal, mesenteric, or iliac FMD  Ascending and descending thoracic aorta normal     Renal duplex 12.2022 normal velocities, normal flow into the kidneys        Assessment/Plan:      55 year-old woman with migraine headaches, episodes of dizziness, neck pain, pulsatile tinnitus. She has been found to have ICA abnormalities  bilaterally, mainly ICA tortuosity, redundancy, S-curves. In the left ICA loop, she may have a few beads. Her imaging is not classical for FMD. It is likely some very mild form of FMD (and tortuosity is associated with this), but its extent would not qualify as a patient with multifocal FMD for the [place] Registry. She has no FMD below the neck, now seen again on 10.2023 [facility] at [facility].  Reasonable to continue aspirin for possible mild FMD. She has no renal FMD.    In terms of FMD, would obtain carotid duplex next year.  Repeat head to pelvis imaging perhaps 2028. She has had more than adequate vascular imaging " at this time, and I think we need to space things out.    BP continues to be a challenge.  BP better on losartan now 100 mg/day + amlodipine 5 mg/day + hydrochlorothiazide 12.5 mg QD She did not tolerate spironolactone. Potassium remains borderline/low despite KCl 20 meq/day. I will recheck aldosterone/renin and consider eplerenone (given breast sx on spironolactone).    From my perspective GLP1 agonist with no CV contraindication and weight loss would beneficial.    RTC 6 months; interval follow up on BP trends. Carotid duplex 2026.    Luna Pagan MD

## 2025-07-30 LAB
ALDOST SERPL-MCNC: NORMAL NG/DL
ALDOST/RENIN PLAS-RTO: NORMAL {RATIO}
ANION GAP SERPL CALCULATED.4IONS-SCNC: 10 MMOL/L (CALC) (ref 7–17)
BUN SERPL-MCNC: 13 MG/DL (ref 7–25)
BUN/CREAT SERPL: ABNORMAL (CALC) (ref 6–22)
CALCIUM SERPL-MCNC: 9.4 MG/DL (ref 8.6–10.4)
CHLORIDE SERPL-SCNC: 103 MMOL/L (ref 98–110)
CO2 SERPL-SCNC: 26 MMOL/L (ref 20–32)
CREAT SERPL-MCNC: 0.94 MG/DL (ref 0.5–1.03)
EGFRCR SERPLBLD CKD-EPI 2021: 71 ML/MIN/1.73M2
GLUCOSE SERPL-MCNC: 146 MG/DL (ref 65–99)
POTASSIUM SERPL-SCNC: 3.6 MMOL/L (ref 3.5–5.3)
RENIN PLAS-CCNC: NORMAL NG/ML/H
SODIUM SERPL-SCNC: 139 MMOL/L (ref 135–146)

## 2025-08-04 ENCOUNTER — PATIENT MESSAGE (OUTPATIENT)
Dept: CARDIOLOGY | Facility: HOSPITAL | Age: 55
End: 2025-08-04
Payer: COMMERCIAL

## 2025-08-04 DIAGNOSIS — I10 ESSENTIAL HYPERTENSION, BENIGN: ICD-10-CM

## 2025-08-04 LAB
ALDOST SERPL-MCNC: 16 NG/DL
ALDOST/RENIN PLAS-RTO: 1.1 RATIO (ref 0.9–28.9)
ANION GAP SERPL CALCULATED.4IONS-SCNC: 10 MMOL/L (CALC) (ref 7–17)
BUN SERPL-MCNC: 13 MG/DL (ref 7–25)
BUN/CREAT SERPL: ABNORMAL (CALC) (ref 6–22)
CALCIUM SERPL-MCNC: 9.4 MG/DL (ref 8.6–10.4)
CHLORIDE SERPL-SCNC: 103 MMOL/L (ref 98–110)
CO2 SERPL-SCNC: 26 MMOL/L (ref 20–32)
CREAT SERPL-MCNC: 0.94 MG/DL (ref 0.5–1.03)
EGFRCR SERPLBLD CKD-EPI 2021: 71 ML/MIN/1.73M2
GLUCOSE SERPL-MCNC: 146 MG/DL (ref 65–99)
POTASSIUM SERPL-SCNC: 3.6 MMOL/L (ref 3.5–5.3)
RENIN PLAS-CCNC: 14.71 NG/ML/H (ref 0.25–5.82)
SODIUM SERPL-SCNC: 139 MMOL/L (ref 135–146)

## 2025-08-07 RX ORDER — EPLERENONE 25 MG/1
25 TABLET ORAL DAILY
Qty: 90 TABLET | Refills: 3 | Status: SHIPPED | OUTPATIENT
Start: 2025-08-07 | End: 2026-08-07